# Patient Record
Sex: FEMALE | Race: WHITE | NOT HISPANIC OR LATINO | Employment: UNEMPLOYED | URBAN - METROPOLITAN AREA
[De-identification: names, ages, dates, MRNs, and addresses within clinical notes are randomized per-mention and may not be internally consistent; named-entity substitution may affect disease eponyms.]

---

## 2017-09-05 PROBLEM — Z00.00 ENCOUNTER FOR PREVENTIVE HEALTH EXAMINATION: Status: ACTIVE | Noted: 2017-09-05

## 2021-10-13 ENCOUNTER — OFFICE VISIT (OUTPATIENT)
Dept: GASTROENTEROLOGY | Facility: CLINIC | Age: 46
End: 2021-10-13
Payer: COMMERCIAL

## 2021-10-13 ENCOUNTER — APPOINTMENT (OUTPATIENT)
Dept: LAB | Facility: CLINIC | Age: 46
End: 2021-10-13
Payer: COMMERCIAL

## 2021-10-13 ENCOUNTER — TELEPHONE (OUTPATIENT)
Dept: GASTROENTEROLOGY | Facility: CLINIC | Age: 46
End: 2021-10-13

## 2021-10-13 VITALS
BODY MASS INDEX: 28.45 KG/M2 | SYSTOLIC BLOOD PRESSURE: 110 MMHG | WEIGHT: 177 LBS | TEMPERATURE: 98.4 F | HEART RATE: 70 BPM | HEIGHT: 66 IN | DIASTOLIC BLOOD PRESSURE: 70 MMHG | OXYGEN SATURATION: 97 %

## 2021-10-13 DIAGNOSIS — R10.84 GENERALIZED ABDOMINAL PAIN: ICD-10-CM

## 2021-10-13 DIAGNOSIS — R10.84 GENERALIZED ABDOMINAL PAIN: Primary | ICD-10-CM

## 2021-10-13 DIAGNOSIS — R14.0 ABDOMINAL BLOATING: ICD-10-CM

## 2021-10-13 DIAGNOSIS — K92.1 HEMATOCHEZIA: ICD-10-CM

## 2021-10-13 LAB
BASOPHILS # BLD AUTO: 0.1 THOUSANDS/ΜL (ref 0–0.1)
BASOPHILS NFR BLD AUTO: 2 % (ref 0–1)
EOSINOPHIL # BLD AUTO: 0.12 THOUSAND/ΜL (ref 0–0.61)
EOSINOPHIL NFR BLD AUTO: 2 % (ref 0–6)
ERYTHROCYTE [DISTWIDTH] IN BLOOD BY AUTOMATED COUNT: 15.3 % (ref 11.6–15.1)
HCT VFR BLD AUTO: 34.9 % (ref 34.8–46.1)
HGB BLD-MCNC: 10.9 G/DL (ref 11.5–15.4)
IGA SERPL-MCNC: 158 MG/DL (ref 70–400)
IMM GRANULOCYTES # BLD AUTO: 0.01 THOUSAND/UL (ref 0–0.2)
IMM GRANULOCYTES NFR BLD AUTO: 0 % (ref 0–2)
LYMPHOCYTES # BLD AUTO: 2.5 THOUSANDS/ΜL (ref 0.6–4.47)
LYMPHOCYTES NFR BLD AUTO: 41 % (ref 14–44)
MCH RBC QN AUTO: 26.1 PG (ref 26.8–34.3)
MCHC RBC AUTO-ENTMCNC: 31.2 G/DL (ref 31.4–37.4)
MCV RBC AUTO: 84 FL (ref 82–98)
MONOCYTES # BLD AUTO: 0.39 THOUSAND/ΜL (ref 0.17–1.22)
MONOCYTES NFR BLD AUTO: 6 % (ref 4–12)
NEUTROPHILS # BLD AUTO: 2.99 THOUSANDS/ΜL (ref 1.85–7.62)
NEUTS SEG NFR BLD AUTO: 49 % (ref 43–75)
NRBC BLD AUTO-RTO: 0 /100 WBCS
PLATELET # BLD AUTO: 459 THOUSANDS/UL (ref 149–390)
PMV BLD AUTO: 10.8 FL (ref 8.9–12.7)
RBC # BLD AUTO: 4.18 MILLION/UL (ref 3.81–5.12)
TSH SERPL DL<=0.05 MIU/L-ACNC: 1.7 UIU/ML (ref 0.36–3.74)
WBC # BLD AUTO: 6.11 THOUSAND/UL (ref 4.31–10.16)

## 2021-10-13 PROCEDURE — 87338 HPYLORI STOOL AG IA: CPT

## 2021-10-13 PROCEDURE — 82784 ASSAY IGA/IGD/IGG/IGM EACH: CPT

## 2021-10-13 PROCEDURE — 83516 IMMUNOASSAY NONANTIBODY: CPT

## 2021-10-13 PROCEDURE — 87329 GIARDIA AG IA: CPT

## 2021-10-13 PROCEDURE — 99242 OFF/OP CONSLTJ NEW/EST SF 20: CPT | Performed by: PHYSICIAN ASSISTANT

## 2021-10-13 PROCEDURE — 84443 ASSAY THYROID STIM HORMONE: CPT

## 2021-10-13 PROCEDURE — 85025 COMPLETE CBC W/AUTO DIFF WBC: CPT

## 2021-10-13 PROCEDURE — 83993 ASSAY FOR CALPROTECTIN FECAL: CPT

## 2021-10-13 PROCEDURE — 36415 COLL VENOUS BLD VENIPUNCTURE: CPT

## 2021-10-13 RX ORDER — SODIUM PICOSULFATE, MAGNESIUM OXIDE, AND ANHYDROUS CITRIC ACID 10; 3.5; 12 MG/160ML; G/160ML; G/160ML
LIQUID ORAL
Qty: 320 ML | Refills: 0 | Status: SHIPPED | OUTPATIENT
Start: 2021-10-13 | End: 2021-11-08

## 2021-10-14 LAB
G LAMBLIA AG STL QL IA: NEGATIVE
H PYLORI AG STL QL IA: NEGATIVE
TTG IGA SER-ACNC: <2 U/ML (ref 0–3)

## 2021-10-15 ENCOUNTER — TELEPHONE (OUTPATIENT)
Dept: GASTROENTEROLOGY | Facility: CLINIC | Age: 46
End: 2021-10-15

## 2021-10-15 ENCOUNTER — PREP FOR PROCEDURE (OUTPATIENT)
Dept: GASTROENTEROLOGY | Facility: CLINIC | Age: 46
End: 2021-10-15

## 2021-10-15 DIAGNOSIS — R10.84 GENERALIZED ABDOMINAL PAIN: Primary | ICD-10-CM

## 2021-10-15 DIAGNOSIS — R14.0 ABDOMINAL BLOATING: ICD-10-CM

## 2021-10-15 DIAGNOSIS — K92.1 HEMATOCHEZIA: Primary | ICD-10-CM

## 2021-10-15 DIAGNOSIS — R10.84 GENERALIZED ABDOMINAL PAIN: ICD-10-CM

## 2021-10-15 LAB — CALPROTECTIN STL-MCNT: <16 UG/G (ref 0–120)

## 2021-11-08 ENCOUNTER — OFFICE VISIT (OUTPATIENT)
Dept: OBGYN CLINIC | Facility: CLINIC | Age: 46
End: 2021-11-08
Payer: COMMERCIAL

## 2021-11-08 VITALS
BODY MASS INDEX: 31.71 KG/M2 | HEIGHT: 63 IN | SYSTOLIC BLOOD PRESSURE: 90 MMHG | WEIGHT: 179 LBS | DIASTOLIC BLOOD PRESSURE: 62 MMHG

## 2021-11-08 DIAGNOSIS — Z11.3 SCREEN FOR STD (SEXUALLY TRANSMITTED DISEASE): ICD-10-CM

## 2021-11-08 DIAGNOSIS — N93.9 ABNORMAL UTERINE BLEEDING (AUB): Primary | ICD-10-CM

## 2021-11-08 DIAGNOSIS — N92.3 INTERMENSTRUAL BLEEDING: ICD-10-CM

## 2021-11-08 DIAGNOSIS — N94.6 DYSMENORRHEA: ICD-10-CM

## 2021-11-08 PROCEDURE — 99203 OFFICE O/P NEW LOW 30 MIN: CPT | Performed by: CLINICAL NURSE SPECIALIST

## 2021-11-08 RX ORDER — IBUPROFEN 800 MG/1
800 TABLET ORAL EVERY 6 HOURS PRN
COMMUNITY

## 2021-11-08 RX ORDER — TRANEXAMIC ACID 650 1/1
1300 TABLET ORAL 2 TIMES DAILY
Qty: 20 TABLET | Refills: 1 | Status: SHIPPED | OUTPATIENT
Start: 2021-11-08

## 2021-11-11 ENCOUNTER — TELEPHONE (OUTPATIENT)
Dept: OBGYN CLINIC | Facility: CLINIC | Age: 46
End: 2021-11-11

## 2021-11-11 LAB
C TRACH RRNA SPEC QL NAA+PROBE: NEGATIVE
N GONORRHOEA RRNA SPEC QL NAA+PROBE: NEGATIVE

## 2021-11-16 ENCOUNTER — TELEPHONE (OUTPATIENT)
Dept: GASTROENTEROLOGY | Facility: AMBULARY SURGERY CENTER | Age: 46
End: 2021-11-16

## 2021-11-16 ENCOUNTER — TELEPHONE (OUTPATIENT)
Dept: GASTROENTEROLOGY | Facility: CLINIC | Age: 46
End: 2021-11-16

## 2021-11-17 ENCOUNTER — ANESTHESIA (OUTPATIENT)
Dept: GASTROENTEROLOGY | Facility: AMBULARY SURGERY CENTER | Age: 46
End: 2021-11-17

## 2021-11-17 ENCOUNTER — TELEPHONE (OUTPATIENT)
Dept: GASTROENTEROLOGY | Facility: AMBULARY SURGERY CENTER | Age: 46
End: 2021-11-17

## 2021-11-17 ENCOUNTER — HOSPITAL ENCOUNTER (OUTPATIENT)
Dept: GASTROENTEROLOGY | Facility: AMBULARY SURGERY CENTER | Age: 46
Setting detail: OUTPATIENT SURGERY
Discharge: HOME | End: 2021-11-17
Attending: INTERNAL MEDICINE
Payer: COMMERCIAL

## 2021-11-17 ENCOUNTER — ANESTHESIA EVENT (OUTPATIENT)
Dept: GASTROENTEROLOGY | Facility: AMBULARY SURGERY CENTER | Age: 46
End: 2021-11-17

## 2021-11-17 VITALS
HEART RATE: 64 BPM | OXYGEN SATURATION: 100 % | TEMPERATURE: 97 F | DIASTOLIC BLOOD PRESSURE: 66 MMHG | RESPIRATION RATE: 16 BRPM | HEIGHT: 62 IN | SYSTOLIC BLOOD PRESSURE: 107 MMHG | BODY MASS INDEX: 32.02 KG/M2 | WEIGHT: 174 LBS

## 2021-11-17 DIAGNOSIS — K64.9 HEMORRHOIDS, UNSPECIFIED HEMORRHOID TYPE: ICD-10-CM

## 2021-11-17 DIAGNOSIS — R14.0 ABDOMINAL BLOATING: ICD-10-CM

## 2021-11-17 DIAGNOSIS — R10.84 GENERALIZED ABDOMINAL PAIN: ICD-10-CM

## 2021-11-17 DIAGNOSIS — K92.1 HEMATOCHEZIA: ICD-10-CM

## 2021-11-17 DIAGNOSIS — K29.80 DUODENITIS: Primary | ICD-10-CM

## 2021-11-17 LAB
EXT PREGNANCY TEST URINE: NEGATIVE
EXT. CONTROL: NORMAL

## 2021-11-17 PROCEDURE — 45378 DIAGNOSTIC COLONOSCOPY: CPT | Performed by: INTERNAL MEDICINE

## 2021-11-17 PROCEDURE — 88305 TISSUE EXAM BY PATHOLOGIST: CPT | Performed by: PATHOLOGY

## 2021-11-17 PROCEDURE — 88342 IMHCHEM/IMCYTCHM 1ST ANTB: CPT | Performed by: PATHOLOGY

## 2021-11-17 PROCEDURE — 43239 EGD BIOPSY SINGLE/MULTIPLE: CPT | Performed by: INTERNAL MEDICINE

## 2021-11-17 PROCEDURE — 81025 URINE PREGNANCY TEST: CPT | Performed by: INTERNAL MEDICINE

## 2021-11-17 RX ORDER — FENTANYL CITRATE 50 UG/ML
INJECTION, SOLUTION INTRAMUSCULAR; INTRAVENOUS AS NEEDED
Status: DISCONTINUED | OUTPATIENT
Start: 2021-11-17 | End: 2021-11-17

## 2021-11-17 RX ORDER — LIDOCAINE HYDROCHLORIDE 10 MG/ML
INJECTION, SOLUTION EPIDURAL; INFILTRATION; INTRACAUDAL; PERINEURAL AS NEEDED
Status: DISCONTINUED | OUTPATIENT
Start: 2021-11-17 | End: 2021-11-17

## 2021-11-17 RX ORDER — SODIUM CHLORIDE, SODIUM LACTATE, POTASSIUM CHLORIDE, CALCIUM CHLORIDE 600; 310; 30; 20 MG/100ML; MG/100ML; MG/100ML; MG/100ML
INJECTION, SOLUTION INTRAVENOUS CONTINUOUS PRN
Status: DISCONTINUED | OUTPATIENT
Start: 2021-11-17 | End: 2021-11-17

## 2021-11-17 RX ORDER — PROPOFOL 10 MG/ML
INJECTION, EMULSION INTRAVENOUS AS NEEDED
Status: DISCONTINUED | OUTPATIENT
Start: 2021-11-17 | End: 2021-11-17

## 2021-11-17 RX ORDER — PROPOFOL 10 MG/ML
INJECTION, EMULSION INTRAVENOUS CONTINUOUS PRN
Status: DISCONTINUED | OUTPATIENT
Start: 2021-11-17 | End: 2021-11-17

## 2021-11-17 RX ORDER — OMEPRAZOLE 40 MG/1
40 CAPSULE, DELAYED RELEASE ORAL DAILY
Qty: 60 CAPSULE | Refills: 1 | Status: SHIPPED | OUTPATIENT
Start: 2021-11-17 | End: 2022-01-19

## 2021-11-17 RX ADMIN — PROPOFOL 50 MG: 10 INJECTION, EMULSION INTRAVENOUS at 13:36

## 2021-11-17 RX ADMIN — FENTANYL CITRATE 50 MCG: 50 INJECTION, SOLUTION INTRAMUSCULAR; INTRAVENOUS at 13:20

## 2021-11-17 RX ADMIN — LIDOCAINE HYDROCHLORIDE 100 MG: 10 INJECTION, SOLUTION EPIDURAL; INFILTRATION; INTRACAUDAL at 13:24

## 2021-11-17 RX ADMIN — PROPOFOL 50 MG: 10 INJECTION, EMULSION INTRAVENOUS at 13:27

## 2021-11-17 RX ADMIN — FENTANYL CITRATE 25 MCG: 50 INJECTION, SOLUTION INTRAMUSCULAR; INTRAVENOUS at 13:29

## 2021-11-17 RX ADMIN — PROPOFOL 150 MG: 10 INJECTION, EMULSION INTRAVENOUS at 13:24

## 2021-11-17 RX ADMIN — PROPOFOL 50 MG: 10 INJECTION, EMULSION INTRAVENOUS at 13:41

## 2021-11-17 RX ADMIN — PROPOFOL 30 MG: 10 INJECTION, EMULSION INTRAVENOUS at 13:30

## 2021-11-17 RX ADMIN — PROPOFOL 20 MG: 10 INJECTION, EMULSION INTRAVENOUS at 13:29

## 2021-11-17 RX ADMIN — SODIUM CHLORIDE, SODIUM LACTATE, POTASSIUM CHLORIDE, AND CALCIUM CHLORIDE: .6; .31; .03; .02 INJECTION, SOLUTION INTRAVENOUS at 13:03

## 2021-11-17 RX ADMIN — PROPOFOL 30 MG: 10 INJECTION, EMULSION INTRAVENOUS at 13:25

## 2021-11-17 RX ADMIN — PROPOFOL 50 MG: 10 INJECTION, EMULSION INTRAVENOUS at 13:38

## 2021-11-17 RX ADMIN — PROPOFOL 20 MG: 10 INJECTION, EMULSION INTRAVENOUS at 13:32

## 2021-11-17 RX ADMIN — PROPOFOL 50 MG: 10 INJECTION, EMULSION INTRAVENOUS at 13:34

## 2021-11-17 RX ADMIN — PROPOFOL 140 MCG/KG/MIN: 10 INJECTION, EMULSION INTRAVENOUS at 13:30

## 2021-11-22 ENCOUNTER — HOSPITAL ENCOUNTER (OUTPATIENT)
Dept: ULTRASOUND IMAGING | Facility: HOSPITAL | Age: 46
Discharge: HOME | End: 2021-11-22
Payer: COMMERCIAL

## 2021-11-22 DIAGNOSIS — N93.9 ABNORMAL UTERINE BLEEDING (AUB): ICD-10-CM

## 2021-11-22 PROCEDURE — 76856 US EXAM PELVIC COMPLETE: CPT

## 2021-11-22 PROCEDURE — 76830 TRANSVAGINAL US NON-OB: CPT

## 2021-12-09 PROBLEM — K64.9 HEMORRHOIDS: Status: ACTIVE | Noted: 2021-12-09

## 2021-12-13 ENCOUNTER — TELEPHONE (OUTPATIENT)
Dept: OBGYN CLINIC | Facility: CLINIC | Age: 46
End: 2021-12-13

## 2021-12-13 ENCOUNTER — OFFICE VISIT (OUTPATIENT)
Dept: OBGYN CLINIC | Facility: CLINIC | Age: 46
End: 2021-12-13
Payer: COMMERCIAL

## 2021-12-13 VITALS — BODY MASS INDEX: 32.56 KG/M2 | DIASTOLIC BLOOD PRESSURE: 72 MMHG | SYSTOLIC BLOOD PRESSURE: 110 MMHG | WEIGHT: 178 LBS

## 2021-12-13 DIAGNOSIS — N93.9 ABNORMAL UTERINE BLEEDING (AUB): Primary | ICD-10-CM

## 2021-12-13 DIAGNOSIS — Z30.430 ENCOUNTER FOR IUD INSERTION: ICD-10-CM

## 2021-12-13 DIAGNOSIS — N94.6 DYSMENORRHEA: ICD-10-CM

## 2021-12-13 PROCEDURE — 58300 INSERT INTRAUTERINE DEVICE: CPT | Performed by: CLINICAL NURSE SPECIALIST

## 2021-12-23 ENCOUNTER — APPOINTMENT (OUTPATIENT)
Dept: LAB | Facility: HOSPITAL | Age: 46
End: 2021-12-23
Payer: COMMERCIAL

## 2021-12-23 DIAGNOSIS — K29.70 HELICOBACTER PYLORI GASTRITIS: ICD-10-CM

## 2021-12-23 DIAGNOSIS — B96.81 HELICOBACTER PYLORI GASTRITIS: ICD-10-CM

## 2021-12-23 PROCEDURE — 87338 HPYLORI STOOL AG IA: CPT

## 2021-12-24 LAB — H PYLORI AG STL QL IA: POSITIVE

## 2022-01-03 ENCOUNTER — OFFICE VISIT (OUTPATIENT)
Dept: OBGYN CLINIC | Facility: CLINIC | Age: 47
End: 2022-01-03
Payer: COMMERCIAL

## 2022-01-03 VITALS — BODY MASS INDEX: 33.11 KG/M2 | DIASTOLIC BLOOD PRESSURE: 70 MMHG | WEIGHT: 181 LBS | SYSTOLIC BLOOD PRESSURE: 100 MMHG

## 2022-01-03 DIAGNOSIS — N93.9 ABNORMAL UTERINE BLEEDING (AUB): ICD-10-CM

## 2022-01-03 DIAGNOSIS — Z30.431 SURVEILLANCE OF INTRAUTERINE CONTRACEPTIVE DEVICE: Primary | ICD-10-CM

## 2022-01-03 DIAGNOSIS — N89.8 LEUKORRHEA: ICD-10-CM

## 2022-01-03 LAB
BV WHIFF TEST VAG QL: NORMAL
CLUE CELLS SPEC QL WET PREP: NORMAL
PH SMN: 5 [PH]
SL AMB POCT WET MOUNT: NORMAL
T VAGINALIS VAG QL WET PREP: NORMAL
YEAST VAG QL WET PREP: NORMAL

## 2022-01-03 PROCEDURE — 87210 SMEAR WET MOUNT SALINE/INK: CPT | Performed by: CLINICAL NURSE SPECIALIST

## 2022-01-03 PROCEDURE — 99213 OFFICE O/P EST LOW 20 MIN: CPT | Performed by: CLINICAL NURSE SPECIALIST

## 2022-01-03 NOTE — PATIENT INSTRUCTIONS
IUD appears normally positioned today  The discomfort you have with her  And heaviness with the bleeding should improve each month and by 6 months we should better at optimum level of control    If you're still having heavy bleeding or painful menses at that point please schedule an appointment with one of our physicians to discuss alternate options     feel free to schedule an appointment with me when you return from being out of country of for another recheck to see how you doing with this option

## 2022-01-03 NOTE — ASSESSMENT & PLAN NOTE
Exam was normal today with IUD string seen at expected length  Reassured regarding common side effects associated w/ the first few months after Mirena IUD insertion  It may take 3-6 months till we reach optimal  Effectiveness      she is going out of the country for a few months and will call our office when she returns for another recheck to see if this is being effective for this

## 2022-01-03 NOTE — PROGRESS NOTES
Assessment/Plan:       1  Surveillance of intrauterine contraceptive device  Assessment & Plan:  Exam was normal today with IUD string seen at expected length  Reassured regarding common side effects associated w/ the first few months after Mirena IUD insertion  It may take 3-6 months till we reach optimal  Effectiveness  she is going out of the country for a few months and will call our office when she returns for another recheck to see if this is being effective for this        2  Abnormal uterine bleeding (AUB)    3  Leukorrhea  -     POCT wet mount          Subjective:      Patient ID: Anna Mckeon is a 42 y o  female  She is here for Follow-up (Mirena follow-up  Having irregular bleeding and cramping  )    HPI  Had Mirena IUD inserted on 12/13  Going out of country and here early for recheck     reported mild intermittent light bleeding  Thinks she may be starting with her menstrual cycle now  She does however continue to have moderate cramping prior to onset of menses  But has only been 3 wks since insertion      She also notices that she has had a slight increase in discharge but is not bothered by this  It does not have a fishy odor and does not feel itchy or irritated  Menstrual History:  Patient's last menstrual period was 12/09/2021 (exact date)  The following portions of the patient's history were reviewed and updated as appropriate: allergies, current medications, past family history, past medical history, past social history, past surgical history, and problem list     Review of Systems  See HPI for pertinent positives          Objective:    /70 (BP Location: Left arm, Patient Position: Sitting, Cuff Size: Adult)   Wt 82 1 kg (181 lb)   LMP 12/09/2021 (Exact Date)   BMI 33 11 kg/m²      Physical Exam  Constitutional:       General: She is not in acute distress  Appearance: Normal appearance  Genitourinary:      Urethral meatus normal       No lesions in the vagina  Right Labia: No rash, lesions or skin changes  Left Labia: No lesions, skin changes or rash  Vaginal discharge (small amt thin yellow d/c   no strong malodor) present  No vaginal erythema, tenderness, bleeding or ulceration  Right Adnexa: not tender, not full and no mass present  Left Adnexa: not tender, not full and no mass present  No cervical motion tenderness, discharge, friability or lesion  IUD strings visualized (approx 3-4 cm as expected)  Uterus is not enlarged or tender  Bladder is not tender  Pelvic exam was performed with patient in the lithotomy position  Rectum:      No external hemorrhoid  HENT:      Head: Normocephalic  Cardiovascular:      Rate and Rhythm: Normal rate  Pulmonary:      Effort: Pulmonary effort is normal    Neurological:      Mental Status: She is alert and oriented to person, place, and time  Psychiatric:         Mood and Affect: Mood normal          Behavior: Behavior normal    Vitals reviewed         Results for orders placed or performed in visit on 01/03/22   POCT wet mount   Result Value Ref Range    WET MOUNT pos     Yeast, Wet Prep neg     pH 5     Whiff Test neg     Clue Cells neg     Trich, Wet Prep neg

## 2022-01-18 DIAGNOSIS — K29.80 DUODENITIS: ICD-10-CM

## 2022-01-18 DIAGNOSIS — R10.84 GENERALIZED ABDOMINAL PAIN: ICD-10-CM

## 2022-01-19 RX ORDER — OMEPRAZOLE 40 MG/1
CAPSULE, DELAYED RELEASE ORAL
Qty: 60 CAPSULE | Refills: 1 | Status: SHIPPED | OUTPATIENT
Start: 2022-01-19

## 2022-07-29 ENCOUNTER — HOSPITAL ENCOUNTER (EMERGENCY)
Facility: HOSPITAL | Age: 47
Discharge: HOME | End: 2022-07-29
Attending: EMERGENCY MEDICINE
Payer: COMMERCIAL

## 2022-07-29 VITALS
HEART RATE: 72 BPM | DIASTOLIC BLOOD PRESSURE: 53 MMHG | TEMPERATURE: 98.3 F | SYSTOLIC BLOOD PRESSURE: 112 MMHG | OXYGEN SATURATION: 99 % | RESPIRATION RATE: 20 BRPM

## 2022-07-29 DIAGNOSIS — E87.6 HYPOKALEMIA: ICD-10-CM

## 2022-07-29 DIAGNOSIS — M26.622 ARTHRALGIA OF LEFT TEMPOROMANDIBULAR JOINT: Primary | ICD-10-CM

## 2022-07-29 LAB
ANION GAP SERPL CALCULATED.3IONS-SCNC: 8 MMOL/L (ref 4–13)
BASOPHILS # BLD AUTO: 0.03 THOUSANDS/ΜL (ref 0–0.1)
BASOPHILS NFR BLD AUTO: 0 % (ref 0–1)
BUN SERPL-MCNC: 8 MG/DL (ref 5–25)
CALCIUM SERPL-MCNC: 9.6 MG/DL (ref 8.3–10.1)
CHLORIDE SERPL-SCNC: 104 MMOL/L (ref 96–108)
CO2 SERPL-SCNC: 26 MMOL/L (ref 21–32)
CREAT SERPL-MCNC: 0.38 MG/DL (ref 0.6–1.3)
EOSINOPHIL # BLD AUTO: 0.11 THOUSAND/ΜL (ref 0–0.61)
EOSINOPHIL NFR BLD AUTO: 2 % (ref 0–6)
ERYTHROCYTE [DISTWIDTH] IN BLOOD BY AUTOMATED COUNT: 12.7 % (ref 11.6–15.1)
GFR SERPL CREATININE-BSD FRML MDRD: 129 ML/MIN/1.73SQ M
GLUCOSE SERPL-MCNC: 122 MG/DL (ref 65–140)
HCT VFR BLD AUTO: 35.2 % (ref 34.8–46.1)
HGB BLD-MCNC: 11.3 G/DL (ref 11.5–15.4)
IMM GRANULOCYTES # BLD AUTO: 0.02 THOUSAND/UL (ref 0–0.2)
IMM GRANULOCYTES NFR BLD AUTO: 0 % (ref 0–2)
LYMPHOCYTES # BLD AUTO: 1.87 THOUSANDS/ΜL (ref 0.6–4.47)
LYMPHOCYTES NFR BLD AUTO: 27 % (ref 14–44)
MCH RBC QN AUTO: 25.9 PG (ref 26.8–34.3)
MCHC RBC AUTO-ENTMCNC: 32.1 G/DL (ref 31.4–37.4)
MCV RBC AUTO: 81 FL (ref 82–98)
MONOCYTES # BLD AUTO: 0.65 THOUSAND/ΜL (ref 0.17–1.22)
MONOCYTES NFR BLD AUTO: 10 % (ref 4–12)
NEUTROPHILS # BLD AUTO: 4.14 THOUSANDS/ΜL (ref 1.85–7.62)
NEUTS SEG NFR BLD AUTO: 61 % (ref 43–75)
NRBC BLD AUTO-RTO: 0 /100 WBCS
PLATELET # BLD AUTO: 356 THOUSANDS/UL (ref 149–390)
PMV BLD AUTO: 11.1 FL (ref 8.9–12.7)
POTASSIUM SERPL-SCNC: 3.3 MMOL/L (ref 3.5–5.3)
RBC # BLD AUTO: 4.36 MILLION/UL (ref 3.81–5.12)
SODIUM SERPL-SCNC: 138 MMOL/L (ref 135–147)
WBC # BLD AUTO: 6.82 THOUSAND/UL (ref 4.31–10.16)

## 2022-07-29 PROCEDURE — 80048 BASIC METABOLIC PNL TOTAL CA: CPT | Performed by: EMERGENCY MEDICINE

## 2022-07-29 PROCEDURE — 96372 THER/PROPH/DIAG INJ SC/IM: CPT

## 2022-07-29 PROCEDURE — 36415 COLL VENOUS BLD VENIPUNCTURE: CPT

## 2022-07-29 PROCEDURE — 85025 COMPLETE CBC W/AUTO DIFF WBC: CPT | Performed by: EMERGENCY MEDICINE

## 2022-07-29 PROCEDURE — 99283 EMERGENCY DEPT VISIT LOW MDM: CPT

## 2022-07-29 PROCEDURE — 99284 EMERGENCY DEPT VISIT MOD MDM: CPT | Performed by: EMERGENCY MEDICINE

## 2022-07-29 RX ORDER — ACETAMINOPHEN 325 MG/1
650 TABLET ORAL ONCE
Status: COMPLETED | OUTPATIENT
Start: 2022-07-29 | End: 2022-07-29

## 2022-07-29 RX ORDER — CYCLOBENZAPRINE HCL 10 MG
10 TABLET ORAL 2 TIMES DAILY PRN
Qty: 20 TABLET | Refills: 0 | Status: SHIPPED | OUTPATIENT
Start: 2022-07-29

## 2022-07-29 RX ORDER — KETOROLAC TROMETHAMINE 30 MG/ML
15 INJECTION, SOLUTION INTRAMUSCULAR; INTRAVENOUS ONCE
Status: COMPLETED | OUTPATIENT
Start: 2022-07-29 | End: 2022-07-29

## 2022-07-29 RX ADMIN — ACETAMINOPHEN 650 MG: 325 TABLET ORAL at 15:18

## 2022-07-29 RX ADMIN — KETOROLAC TROMETHAMINE 15 MG: 30 INJECTION, SOLUTION INTRAMUSCULAR at 15:18

## 2022-07-29 NOTE — ED PROVIDER NOTES
Pt Name: Casey Velásquez  MRN: [id number]  [place] 1975  Age/Sex: 42 y o  female  Date of evaluation: 2022  PCP: Darcy Cortés MD    CHIEF COMPLAINT    Chief Complaint   Patient presents with   Keyona Lambert     Patient c/o left ear pain on and off for 6 months  Patient states"ear pain goes away and comes back"         HPI and MDM    42 y o  female presenting with left ear pain on and off for the last year  Usually lasts a week or so  This time it has been lasting for 3 weeks now  Has never seen anybody for this  No trauma  No difficulty hearing  No headache, no fevers or chills  No drainage  Pain goes into her left jaw  It is worse with opening her mouth  No toothache no numbness or tingling  No signs of infection on examination  Patient's presentation consistent with left TMJ syndrome  Patient uncertain if she grinds her teeth at night  Pain is worse with opening her mouth  She does have tenderness over the left TMJ, states it does click sometimes  History of gastric ulcers there for cannot take prolonged NSAIDs  Given a dose of Toradol and Tylenol in the emergency department  Will try muscle relaxers  Advised OMS/ENT follow-up  PCP follow-up  Return precautions discussed, patient verbalized understanding and is in agreement plan  Medications   ketorolac (TORADOL) injection 15 mg (15 mg Intramuscular Given 22 151)   acetaminophen (TYLENOL) tablet 650 mg (650 mg Oral Given 22)         Past Medical and Surgical History    Past Medical History:   Diagnosis Date    Fibroid uterus     PCOS (polycystic ovarian syndrome)        Past Surgical History:   Procedure Laterality Date     SECTION      COLONOSCOPY         History reviewed  No pertinent family history      Social History     Tobacco Use    Smoking status: Never Smoker    Smokeless tobacco: Never Used   Vaping Use    Vaping Use: Never used   Substance Use Topics    Alcohol use: Never    Drug use: Never           Allergies    No Known Allergies    Home Medications    Prior to Admission medications    Medication Sig Start Date End Date Taking? Authorizing Provider   cyclobenzaprine (FLEXERIL) 10 mg tablet Take 1 tablet (10 mg total) by mouth 2 (two) times a day as needed for muscle spasms 7/29/22  Yes Bryan Dykes DO   ibuprofen (MOTRIN) 800 mg tablet Take 800 mg by mouth every 6 (six) hours as needed for mild pain  Patient not taking: Reported on 1/3/2022     Historical Provider, MD   levonorgestrel (MIRENA) 20 MCG/24HR IUD 1 each by Intrauterine route once    Historical Provider, MD   omeprazole (PriLOSEC) 40 MG capsule TAKE 1 CAPSULE BY MOUTH EVERY DAY 1/19/22   Scott Betancur MD   pantoprazole (PROTONIX) 40 mg tablet Take 1 tablet (40 mg total) by mouth 2 (two) times a day for 14 days 1/6/22 1/20/22  Elroy Bradford PA-C   Tranexamic Acid (Lysteda) 650 MG TABS Take 2 tablets (1,300 mg total) by mouth 2 (two) times a day  Patient not taking: Reported on 12/13/2021 11/8/21   HUMA Lewis           Review of Systems    Review of Systems   Constitutional: Negative for chills and fever  HENT: Positive for ear pain  Negative for rhinorrhea and sore throat  Eyes: Negative for pain and visual disturbance  Respiratory: Negative for cough and shortness of breath  Cardiovascular: Negative for chest pain and leg swelling  Gastrointestinal: Negative for abdominal pain, nausea and vomiting  Genitourinary: Negative for dysuria and hematuria  Musculoskeletal: Negative for back pain and myalgias  Skin: Negative for rash and wound  Neurological: Negative for syncope and headaches             Physical Exam      ED Triage Vitals [07/29/22 1146]   Temperature Pulse Respirations Blood Pressure SpO2   98 3 °F (36 8 °C) (!) 117 16 124/65 98 %      Temp Source Heart Rate Source Patient Position - Orthostatic VS BP Location FiO2 (%)   Oral Monitor Sitting Left arm --      Pain Score --               Physical Exam  Constitutional:       General: She is not in acute distress  Appearance: She is not ill-appearing  HENT:      Head: Normocephalic and atraumatic  Right Ear: Tympanic membrane, ear canal and external ear normal       Left Ear: Tympanic membrane, ear canal and external ear normal       Ears:      Comments: No tenderness over mastoid processes     Nose: Nose normal       Mouth/Throat:      Mouth: Mucous membranes are moist       Comments: L TMJ tenderness to palpation  Eyes:      Extraocular Movements: Extraocular movements intact  Pupils: Pupils are equal, round, and reactive to light  Cardiovascular:      Rate and Rhythm: Normal rate and regular rhythm  Pulmonary:      Effort: Pulmonary effort is normal  No respiratory distress  Abdominal:      General: There is no distension  Musculoskeletal:         General: No swelling or deformity  Normal range of motion  Cervical back: Normal range of motion and neck supple  Skin:     General: Skin is warm  Findings: No erythema  Neurological:      Mental Status: She is alert and oriented to person, place, and time  Mental status is at baseline                Diagnostic Results      Labs:    Results Reviewed     Procedure Component Value Units Date/Time    CBC and differential [[id number]]  (Abnormal) Collected: 07/29/22 1213    Lab Status: Final result Specimen: Blood from Arm, Left Updated: 07/29/22 1236     WBC 6 82 Thousand/uL      RBC 4 36 Million/uL      Hemoglobin 11 3 g/dL      Hematocrit 35 2 %      MCV 81 fL      MCH 25 9 pg      MCHC 32 1 g/dL      RDW 12 7 %      MPV 11 1 fL      Platelets 115 Thousands/uL      nRBC 0 /100 WBCs      Neutrophils Relative 61 %      Immat GRANS % 0 %      Lymphocytes Relative 27 %      Monocytes Relative 10 %      Eosinophils Relative 2 %      Basophils Relative 0 %      Neutrophils Absolute 4 14 Thousands/µL      Immature Grans Absolute 0 02 Thousand/uL Lymphocytes Absolute 1 87 Thousands/µL      Monocytes Absolute 0 65 Thousand/µL      Eosinophils Absolute 0 11 Thousand/µL      Basophils Absolute 0 03 Thousands/µL     Basic metabolic panel [[id number]]  (Abnormal) Collected: 07/29/22 1213    Lab Status: Final result Specimen: Blood from Arm, Left Updated: 07/29/22 1235     Sodium 138 mmol/L      Potassium 3 3 mmol/L      Chloride 104 mmol/L      CO2 26 mmol/L      ANION GAP 8 mmol/L      BUN 8 mg/dL      Creatinine 0 38 mg/dL      Glucose 122 mg/dL      Calcium 9 6 mg/dL      eGFR 129 ml/min/1 73sq m     Narrative:      [place] guidelines for Chronic Kidney Disease (CKD):     Stage 1 with normal or high GFR (GFR > 90 mL/min/1 73 square meters)    Stage 2 Mild CKD (GFR = 60-89 mL/min/1 73 square meters)    Stage 3A Moderate CKD (GFR = 45-59 mL/min/1 73 square meters)    Stage 3B Moderate CKD (GFR = 30-44 mL/min/1 73 square meters)    Stage 4 Severe CKD (GFR = 15-29 mL/min/1 73 square meters)    Stage 5 End Stage CKD (GFR <15 mL/min/1 73 square meters)  Note: GFR calculation is accurate only with a steady state creatinine          All labs reviewed and utilized in the medical decision making process    Radiology:    No orders to display       All radiology studies independently viewed by me and interpreted by the radiologist     Procedure    Procedures        FINAL IMPRESSION    Final diagnoses:   Arthralgia of left temporomandibular joint   Hypokalemia         DISPOSITION    Time reflects when diagnosis was documented in both MDM as applicable and the Disposition within this note     Time User Action Codes Description Comment    7/29/2022  3:11 PM Melvena Orn Add [M26 622] Arthralgia of left temporomandibular joint     7/29/2022  3:26 PM Melvena Orn Add [E87 6] Hypokalemia       ED Disposition     ED Disposition   Discharge    Condition   Stable    Date/Time   Fri Jul 29, 2022  3:11 PM    Comment   Chary Dominguez discharge to home/self care  Follow-up Information     Follow up With Specialties Details Why Contact Info Additional [address]  for Oral and Maxillofacial Surgery [place]  Schedule an appointment as soon as possible for a visit in 1 week    Daryl Riley [address]     Marie Mccarty MD Ophthalmology   [address] [facility] [address]  [phone]       [facility], [address]  Throat Otolaryngology   [address] [facility] [address] [place], [address] [facility], [address]    [address] [facility] [address] [place], [address]            PATIENT REFERRED TO:    [facility] for Oral and Maxillofacial [facility] [place]  [address]  [address]  Schedule an appointment as soon as possible for a visit in 1 week      Marie Mccarty MD  [address] [facility] [address]  [phone]          [address] [address] Ear, [address] [place] [id number] [phone]          DISCHARGE MEDICATIONS:    Discharge Medication List as of 7/29/2022  3:30 PM      START taking these medications    Details   cyclobenzaprine (FLEXERIL) 10 mg tablet Take 1 tablet (10 mg total) by mouth 2 (two) times a day as needed for muscle spasms, Starting Fri 7/29/2022, Normal         CONTINUE these medications which have NOT CHANGED    Details   ibuprofen (MOTRIN) 800 mg tablet Take 800 mg by mouth every 6 (six) hours as needed for mild pain, Historical Med      levonorgestrel (MIRENA) 20 MCG/24HR IUD 1 each by Intrauterine route once, Historical Med      omeprazole (PriLOSEC) 40 MG capsule TAKE 1 CAPSULE BY MOUTH EVERY DAY, Normal      pantoprazole (PROTONIX) 40 mg tablet Take 1 tablet (40 mg total) by mouth 2 (two) times a day for 14 days, Starting Thu 1/6/2022, Until Thu 1/20/2022, Normal      Tranexamic Acid (Lysteda) 650 MG TABS Take 2 tablets (1,300 mg total) by mouth 2 (two) times a day, Starting Mon 11/8/2021, Normal             No discharge procedures on file  Edwin Telles DO        This note was partially completed using voice recognition technology, and was scanned for gross errors; however some errors may still exist  Please contact the author with any questions or requests for clarification        Edwin Telles DO  07/29/22 6845

## 2024-09-03 ENCOUNTER — APPOINTMENT (OUTPATIENT)
Dept: OBGYN | Facility: CLINIC | Age: 49
End: 2024-09-03
Payer: COMMERCIAL

## 2024-09-03 VITALS
HEART RATE: 97 BPM | DIASTOLIC BLOOD PRESSURE: 81 MMHG | OXYGEN SATURATION: 97 % | SYSTOLIC BLOOD PRESSURE: 134 MMHG | WEIGHT: 169 LBS

## 2024-09-03 DIAGNOSIS — Z82.49 FAMILY HISTORY OF ISCHEMIC HEART DISEASE AND OTHER DISEASES OF THE CIRCULATORY SYSTEM: ICD-10-CM

## 2024-09-03 DIAGNOSIS — D25.9 LEIOMYOMA OF UTERUS, UNSPECIFIED: ICD-10-CM

## 2024-09-03 DIAGNOSIS — D69.9 HEMORRHAGIC CONDITION, UNSPECIFIED: ICD-10-CM

## 2024-09-03 DIAGNOSIS — E05.00 THYROTOXICOSIS WITH DIFFUSE GOITER W/OUT THYROTOXIC CRISIS OR STORM: ICD-10-CM

## 2024-09-03 DIAGNOSIS — Z78.9 OTHER SPECIFIED HEALTH STATUS: ICD-10-CM

## 2024-09-03 PROCEDURE — 99203 OFFICE O/P NEW LOW 30 MIN: CPT

## 2024-09-03 NOTE — HISTORY OF PRESENT ILLNESS
[FreeTextEntry1] : 49 yo  c/s lmp almost continuous has mirena and helped  but continues to be almost daily.  referred by Dr Truong No bx no recent sono. PMHX graves, eye issues PSHX c/s , thyroidectomy, eye sx MEDS synthroid, prednisone for the eyes NKDA FHX neg

## 2024-09-03 NOTE — PLAN
[FreeTextEntry1] : Discussed the option of continued conservative observation of fibroids vs surgical removal. Treatment options of myomectomy, hysterectomy, ufe and continued observation were also outlined. A detailed discussion regarding the option of myomectomy vs hysterectomy was also held and the risks, benefits, and alternatives provided. All questions answered discussed the risks including but not limited to need for laparotomy, blood transfusion , possible postop infection, and injury to to GI or  tract.  Pt to schedule tlh bs. Pt needs pelvic sono and endo bx in office. During this visit 40 minutes were spent face-to-face with greater than 50% of the time dedicated to counseling.

## 2024-09-03 NOTE — PHYSICAL EXAM
[Chaperone Declined] : Patient declined chaperone [Soft] : soft [Non-tender] : non-tender [Non-distended] : non-distended [No Lesions] : no lesions [FreeTextEntry7] : 12 wks nt

## 2024-09-17 ENCOUNTER — TRANSCRIPTION ENCOUNTER (OUTPATIENT)
Age: 49
End: 2024-09-17

## 2024-09-24 ENCOUNTER — NON-APPOINTMENT (OUTPATIENT)
Age: 49
End: 2024-09-24

## 2024-10-15 ENCOUNTER — APPOINTMENT (OUTPATIENT)
Dept: OBGYN | Facility: CLINIC | Age: 49
End: 2024-10-15

## 2024-10-15 VITALS
DIASTOLIC BLOOD PRESSURE: 77 MMHG | SYSTOLIC BLOOD PRESSURE: 119 MMHG | TEMPERATURE: 97 F | OXYGEN SATURATION: 97 % | WEIGHT: 176.59 LBS | HEART RATE: 82 BPM | RESPIRATION RATE: 16 BRPM | HEIGHT: 63 IN

## 2024-10-15 NOTE — ASU PATIENT PROFILE, ADULT - FALL HARM RISK - RISK INTERVENTIONS

## 2024-10-15 NOTE — ASU PATIENT PROFILE, ADULT - NSICDXPASTSURGICALHX_GEN_ALL_CORE_FT
PAST SURGICAL HISTORY:  H/O  section     H/O eye surgery     H/O thyroidectomy     History of nasal surgery

## 2024-10-15 NOTE — ASU PATIENT PROFILE, ADULT - NSICDXPASTMEDICALHX_GEN_ALL_CORE_FT
PAST MEDICAL HISTORY:  Anemia     Fibroid     H/O thyroid nodule     H/O thyrotoxicosis     Visual impairment

## 2024-10-15 NOTE — ASU PATIENT PROFILE, ADULT - PREOP PAIN SCORE
Patient attended Phase 2 Cardiac Rehab Exercise Session. Further documentation will be scanned into the medical record upon discharge.  
0

## 2024-10-16 ENCOUNTER — TRANSCRIPTION ENCOUNTER (OUTPATIENT)
Age: 49
End: 2024-10-16

## 2024-10-16 ENCOUNTER — RESULT REVIEW (OUTPATIENT)
Age: 49
End: 2024-10-16

## 2024-10-16 ENCOUNTER — INPATIENT (INPATIENT)
Facility: HOSPITAL | Age: 49
LOS: 0 days | Discharge: HOME | DRG: 743 | End: 2024-10-17
Attending: OBSTETRICS & GYNECOLOGY | Admitting: OBSTETRICS & GYNECOLOGY
Payer: COMMERCIAL

## 2024-10-16 ENCOUNTER — APPOINTMENT (OUTPATIENT)
Dept: OBGYN | Facility: HOSPITAL | Age: 49
End: 2024-10-16

## 2024-10-16 DIAGNOSIS — Z98.891 HISTORY OF UTERINE SCAR FROM PREVIOUS SURGERY: Chronic | ICD-10-CM

## 2024-10-16 DIAGNOSIS — Z98.890 OTHER SPECIFIED POSTPROCEDURAL STATES: Chronic | ICD-10-CM

## 2024-10-16 DIAGNOSIS — E89.0 POSTPROCEDURAL HYPOTHYROIDISM: Chronic | ICD-10-CM

## 2024-10-16 LAB
BLD GP AB SCN SERPL QL: NEGATIVE — SIGNIFICANT CHANGE UP
GLUCOSE BLDC GLUCOMTR-MCNC: 99 MG/DL — SIGNIFICANT CHANGE UP (ref 70–99)
RH IG SCN BLD-IMP: POSITIVE — SIGNIFICANT CHANGE UP

## 2024-10-16 PROCEDURE — 58571 TLH W/T/O 250 G OR LESS: CPT

## 2024-10-16 PROCEDURE — 88302 TISSUE EXAM BY PATHOLOGIST: CPT | Mod: 26

## 2024-10-16 PROCEDURE — 88307 TISSUE EXAM BY PATHOLOGIST: CPT | Mod: 26

## 2024-10-16 DEVICE — AGENT HEMOSTATIC HEMOBLAST 1.65G 10CM
Type: IMPLANTABLE DEVICE | Site: BILATERAL | Status: NON-FUNCTIONAL
Removed: 2024-10-16

## 2024-10-16 RX ORDER — CELECOXIB 200 MG/1
400 CAPSULE ORAL ONCE
Refills: 0 | Status: COMPLETED | OUTPATIENT
Start: 2024-10-16 | End: 2024-10-16

## 2024-10-16 RX ORDER — PANTOPRAZOLE SODIUM 40 MG/1
20 TABLET, DELAYED RELEASE ORAL DAILY
Refills: 0 | Status: DISCONTINUED | OUTPATIENT
Start: 2024-10-16 | End: 2024-10-17

## 2024-10-16 RX ORDER — ERGOCALCIFEROL 1.25 MG/1
0 CAPSULE ORAL
Refills: 0 | DISCHARGE

## 2024-10-16 RX ORDER — KETOROLAC TROMETHAMINE 10 MG/1
30 TABLET, FILM COATED ORAL EVERY 6 HOURS
Refills: 0 | Status: DISCONTINUED | OUTPATIENT
Start: 2024-10-16 | End: 2024-10-17

## 2024-10-16 RX ORDER — METOCLOPRAMIDE HCL 5 MG
10 TABLET ORAL EVERY 6 HOURS
Refills: 0 | Status: DISCONTINUED | OUTPATIENT
Start: 2024-10-16 | End: 2024-10-17

## 2024-10-16 RX ORDER — ONDANSETRON HCL/PF 4 MG/2 ML
8 VIAL (ML) INJECTION EVERY 6 HOURS
Refills: 0 | Status: DISCONTINUED | OUTPATIENT
Start: 2024-10-16 | End: 2024-10-17

## 2024-10-16 RX ORDER — ACETAMINOPHEN 325 MG
1000 TABLET ORAL ONCE
Refills: 0 | Status: COMPLETED | OUTPATIENT
Start: 2024-10-16 | End: 2024-10-16

## 2024-10-16 RX ORDER — HYDROMORPHONE HYDROCHLORIDE 1 MG/ML
0.2 INJECTION, SOLUTION INTRAMUSCULAR; INTRAVENOUS; SUBCUTANEOUS
Refills: 0 | Status: DISCONTINUED | OUTPATIENT
Start: 2024-10-16 | End: 2024-10-16

## 2024-10-16 RX ORDER — ONDANSETRON HCL/PF 4 MG/2 ML
1 VIAL (ML) INJECTION
Refills: 0 | DISCHARGE

## 2024-10-16 RX ORDER — ACETAMINOPHEN 325 MG
1000 TABLET ORAL EVERY 6 HOURS
Refills: 0 | Status: DISCONTINUED | OUTPATIENT
Start: 2024-10-16 | End: 2024-10-17

## 2024-10-16 RX ORDER — SODIUM CHLORIDE IRRIG SOLUTION 0.9 %
1000 SOLUTION, IRRIGATION IRRIGATION
Refills: 0 | Status: DISCONTINUED | OUTPATIENT
Start: 2024-10-16 | End: 2024-10-17

## 2024-10-16 RX ORDER — ANTACID TABLETS 500 MG/1
1 TABLET, CHEWABLE ORAL
Refills: 0 | DISCHARGE

## 2024-10-16 RX ORDER — OXYCODONE HYDROCHLORIDE 30 MG/1
5 TABLET, FILM COATED, EXTENDED RELEASE ORAL EVERY 4 HOURS
Refills: 0 | Status: DISCONTINUED | OUTPATIENT
Start: 2024-10-16 | End: 2024-10-17

## 2024-10-16 RX ORDER — OXYCODONE HYDROCHLORIDE 30 MG/1
1 TABLET, FILM COATED, EXTENDED RELEASE ORAL
Qty: 8 | Refills: 0
Start: 2024-10-16 | End: 2024-10-17

## 2024-10-16 RX ORDER — OXYCODONE HYDROCHLORIDE 30 MG/1
10 TABLET, FILM COATED, EXTENDED RELEASE ORAL EVERY 4 HOURS
Refills: 0 | Status: DISCONTINUED | OUTPATIENT
Start: 2024-10-16 | End: 2024-10-17

## 2024-10-16 RX ADMIN — HYDROMORPHONE HYDROCHLORIDE 0.2 MILLIGRAM(S): 1 INJECTION, SOLUTION INTRAMUSCULAR; INTRAVENOUS; SUBCUTANEOUS at 17:41

## 2024-10-16 RX ADMIN — Medication 10 MILLIGRAM(S): at 20:35

## 2024-10-16 RX ADMIN — CELECOXIB 400 MILLIGRAM(S): 200 CAPSULE ORAL at 11:42

## 2024-10-16 RX ADMIN — Medication 1000 MILLIGRAM(S): at 18:09

## 2024-10-16 RX ADMIN — Medication 8 MILLIGRAM(S): at 19:05

## 2024-10-16 RX ADMIN — HYDROMORPHONE HYDROCHLORIDE 0.2 MILLIGRAM(S): 1 INJECTION, SOLUTION INTRAMUSCULAR; INTRAVENOUS; SUBCUTANEOUS at 17:25

## 2024-10-16 RX ADMIN — Medication 1000 MILLIGRAM(S): at 11:41

## 2024-10-16 RX ADMIN — HYDROMORPHONE HYDROCHLORIDE 0.2 MILLIGRAM(S): 1 INJECTION, SOLUTION INTRAMUSCULAR; INTRAVENOUS; SUBCUTANEOUS at 18:02

## 2024-10-16 RX ADMIN — Medication 5000 UNIT(S): at 11:43

## 2024-10-16 RX ADMIN — KETOROLAC TROMETHAMINE 30 MILLIGRAM(S): 10 TABLET, FILM COATED ORAL at 18:09

## 2024-10-16 RX ADMIN — Medication 80 MILLIGRAM(S): at 18:15

## 2024-10-16 RX ADMIN — HYDROMORPHONE HYDROCHLORIDE 0.2 MILLIGRAM(S): 1 INJECTION, SOLUTION INTRAMUSCULAR; INTRAVENOUS; SUBCUTANEOUS at 19:07

## 2024-10-16 RX ADMIN — OXYCODONE HYDROCHLORIDE 5 MILLIGRAM(S): 30 TABLET, FILM COATED, EXTENDED RELEASE ORAL at 20:05

## 2024-10-16 RX ADMIN — HYDROMORPHONE HYDROCHLORIDE 0.2 MILLIGRAM(S): 1 INJECTION, SOLUTION INTRAMUSCULAR; INTRAVENOUS; SUBCUTANEOUS at 16:43

## 2024-10-16 RX ADMIN — PANTOPRAZOLE SODIUM 20 MILLIGRAM(S): 40 TABLET, DELAYED RELEASE ORAL at 20:00

## 2024-10-16 RX ADMIN — HYDROMORPHONE HYDROCHLORIDE 0.2 MILLIGRAM(S): 1 INJECTION, SOLUTION INTRAMUSCULAR; INTRAVENOUS; SUBCUTANEOUS at 19:08

## 2024-10-16 NOTE — H&P ADULT - NSHPPHYSICALEXAM_GEN_ALL_CORE
T(C): 35.9 (10-16-24 @ 12:38), Max: 35.9 (10-16-24 @ 12:38)  HR: 73 (10-16-24 @ 19:58) (66 - 106)  BP: 103/55 (10-16-24 @ 19:58) (103/55 - 121/89)  RR: 14 (10-16-24 @ 19:58) (12 - 28)  SpO2: 93% (10-16-24 @ 19:58) (89% - 100%)    CONSTITUTIONAL: Well groomed, no apparent distress  EYES: exophthalmos bilaterally  RESP: No respiratory distress, no use of accessory muscles  GI: Soft, appropriately tender, ND, no rebound, no guarding  SKIN: No rashes or ulcers noted; no subcutaneous nodule

## 2024-10-16 NOTE — PRE-ANESTHESIA EVALUATION ADULT - NSANTHPEFT_GEN_ALL_CORE
well developed well nourished Female In NAD  +Exopthalmos  awake, alert, and oriented  +S1/S2  B/L air entry

## 2024-10-16 NOTE — ASU DISCHARGE PLAN (ADULT/PEDIATRIC) - FINANCIAL ASSISTANCE
[facility] provides services at a reduced cost to those who are determined to be eligible through [facility]’s financial assistance program. Information regarding [facility]’s financial assistance program can be found by going to [email or web address] or by calling [phone].

## 2024-10-16 NOTE — BRIEF OPERATIVE NOTE - NSICDXBRIEFPROCEDURE_GEN_ALL_CORE_FT
PROCEDURES:  Laparoscopic total hysterectomy with bilateral salpingectomy and cystoscopy 16-Oct-2024 16:49:59  Luis A Velásquez

## 2024-10-16 NOTE — BRIEF OPERATIVE NOTE - OPERATION/FINDINGS
EUA revealed 14cm mobile AV uterus. Open Collins entry in umbilicus, 12mm port placed. 3 additional 5mm ports placed. Normal ovaries and fallopian tubes. Uterus 14cm with multiple fibroids visualized. TLH, BS completed. Cysto completed, bilateral ureteral jets visualized with good efflux of urine. Fascia closed with vicryl. Skin closed with monocryl. Excellent hemostasis. EBL 350cc.

## 2024-10-16 NOTE — PRE-ANESTHESIA EVALUATION ADULT - NSPROPOSEDPROCEDFT_GEN_ALL_CORE
Total Laparascopic Hysterectomy B/L salpingectomy. Possible Right Ovarian cystectomy Total Laparascopic Hysterectomy B/L salpingectomy. Possible Right Ovarian cystectomy.  Possible right salpingoophrectomy. Possible Cystoscopy.  POssible Laparatomy

## 2024-10-16 NOTE — H&P ADULT - ASSESSMENT
48 yo s/p TLH, BS, cystoscopy for fibroid uterus. Admit to GYN for postoperative monitoring, pain control.    Neuro: tylenol/toradol ATC, oxy PRN  CV: VSS  Pulm: RA  GI/FEN: LFD, , simethicone ATC, protonix QD, zofran/reglan PRN. -/-  Endo: Grave disease - levothyroxine QD  GYN: s/p TLH, BS, cysto 10/16  : s/p cook  Heme: H 13.6  DVT: SCDs    A+ 10/16

## 2024-10-16 NOTE — ASU DISCHARGE PLAN (ADULT/PEDIATRIC) - ACTIVITY LEVEL
2wks or until cleared by your doctor/No exercise/No heavy lifting/Weight bearing as tolerated/Nothing per rectum/Nothing per vagina/No tub baths/No douching/No tampons/No intercourse

## 2024-10-16 NOTE — H&P ADULT - HISTORY OF PRESENT ILLNESS
50 yo  presented for scheduled surgical management of symptomatic fibroids.    OB: C/S x2  GYN: Fibroids; Hx mirena  PMHX graves, eye issues  PSHX c/s , thyroidectomy, eye sx  MEDS synthroid, prednisone for the eyes  NKDA  FHX neg

## 2024-10-16 NOTE — ASU DISCHARGE PLAN (ADULT/PEDIATRIC) - CARE PROVIDER_API CALL
Frank Bañuelos  Obstetrics and Gynecology  [address] [place] [address], [address]  [place] [address]  Phone: [phone]  Fax: [phone]  Follow Up Time:

## 2024-10-17 ENCOUNTER — TRANSCRIPTION ENCOUNTER (OUTPATIENT)
Age: 49
End: 2024-10-17

## 2024-10-17 VITALS
TEMPERATURE: 98 F | HEART RATE: 65 BPM | RESPIRATION RATE: 16 BRPM | SYSTOLIC BLOOD PRESSURE: 95 MMHG | DIASTOLIC BLOOD PRESSURE: 60 MMHG | OXYGEN SATURATION: 98 %

## 2024-10-17 LAB
BASOPHILS # BLD AUTO: 0.1 K/UL — SIGNIFICANT CHANGE UP (ref 0–0.2)
BASOPHILS NFR BLD AUTO: 1.3 % — SIGNIFICANT CHANGE UP (ref 0–2)
EOSINOPHIL # BLD AUTO: 0.1 K/UL — SIGNIFICANT CHANGE UP (ref 0–0.5)
EOSINOPHIL NFR BLD AUTO: 1.3 % — SIGNIFICANT CHANGE UP (ref 0–6)
HCT VFR BLD CALC: 29.5 % — LOW (ref 34.5–45)
HGB BLD-MCNC: 9.4 G/DL — LOW (ref 11.5–15.5)
IMM GRANULOCYTES NFR BLD AUTO: 0.3 % — SIGNIFICANT CHANGE UP (ref 0–0.9)
LYMPHOCYTES # BLD AUTO: 2.76 K/UL — SIGNIFICANT CHANGE UP (ref 1–3.3)
LYMPHOCYTES # BLD AUTO: 34.8 % — SIGNIFICANT CHANGE UP (ref 13–44)
MCHC RBC-ENTMCNC: 27.9 PG — SIGNIFICANT CHANGE UP (ref 27–34)
MCHC RBC-ENTMCNC: 31.9 GM/DL — LOW (ref 32–36)
MCV RBC AUTO: 87.5 FL — SIGNIFICANT CHANGE UP (ref 80–100)
MONOCYTES # BLD AUTO: 0.64 K/UL — SIGNIFICANT CHANGE UP (ref 0–0.9)
MONOCYTES NFR BLD AUTO: 8.1 % — SIGNIFICANT CHANGE UP (ref 2–14)
NEUTROPHILS # BLD AUTO: 4.3 K/UL — SIGNIFICANT CHANGE UP (ref 1.8–7.4)
NEUTROPHILS NFR BLD AUTO: 54.2 % — SIGNIFICANT CHANGE UP (ref 43–77)
NRBC # BLD: 0 /100 WBCS — SIGNIFICANT CHANGE UP (ref 0–0)
PLATELET # BLD AUTO: 389 K/UL — SIGNIFICANT CHANGE UP (ref 150–400)
RBC # BLD: 3.37 M/UL — LOW (ref 3.8–5.2)
RBC # FLD: 13.3 % — SIGNIFICANT CHANGE UP (ref 10.3–14.5)
WBC # BLD: 7.92 K/UL — SIGNIFICANT CHANGE UP (ref 3.8–10.5)
WBC # FLD AUTO: 7.92 K/UL — SIGNIFICANT CHANGE UP (ref 3.8–10.5)

## 2024-10-17 PROCEDURE — 86900 BLOOD TYPING SEROLOGIC ABO: CPT

## 2024-10-17 PROCEDURE — C9399: CPT

## 2024-10-17 PROCEDURE — 36415 COLL VENOUS BLD VENIPUNCTURE: CPT

## 2024-10-17 PROCEDURE — 86850 RBC ANTIBODY SCREEN: CPT

## 2024-10-17 PROCEDURE — 86901 BLOOD TYPING SEROLOGIC RH(D): CPT

## 2024-10-17 PROCEDURE — 88307 TISSUE EXAM BY PATHOLOGIST: CPT

## 2024-10-17 PROCEDURE — 82962 GLUCOSE BLOOD TEST: CPT

## 2024-10-17 PROCEDURE — 88302 TISSUE EXAM BY PATHOLOGIST: CPT

## 2024-10-17 PROCEDURE — 85025 COMPLETE CBC W/AUTO DIFF WBC: CPT

## 2024-10-17 RX ORDER — ACETAMINOPHEN 325 MG
2 TABLET ORAL
Qty: 0 | Refills: 0 | DISCHARGE
Start: 2024-10-17

## 2024-10-17 RX ORDER — POLYVINYL ALCOHOL 1 %
1 DROPS OPHTHALMIC (EYE) EVERY 8 HOURS
Refills: 0 | Status: DISCONTINUED | OUTPATIENT
Start: 2024-10-17 | End: 2024-10-17

## 2024-10-17 RX ADMIN — Medication 1000 MILLIGRAM(S): at 05:19

## 2024-10-17 RX ADMIN — Medication 80 MILLIGRAM(S): at 00:23

## 2024-10-17 RX ADMIN — KETOROLAC TROMETHAMINE 30 MILLIGRAM(S): 10 TABLET, FILM COATED ORAL at 00:23

## 2024-10-17 RX ADMIN — Medication 137 MICROGRAM(S): at 05:20

## 2024-10-17 RX ADMIN — KETOROLAC TROMETHAMINE 30 MILLIGRAM(S): 10 TABLET, FILM COATED ORAL at 05:19

## 2024-10-17 RX ADMIN — Medication 1 DROP(S): at 06:53

## 2024-10-17 RX ADMIN — Medication 80 MILLIGRAM(S): at 05:19

## 2024-10-17 RX ADMIN — Medication 1000 MILLIGRAM(S): at 00:23

## 2024-10-17 NOTE — DISCHARGE NOTE PROVIDER - NSDCFUSCHEDAPPT_GEN_ALL_CORE_FT
Frank Bañuelos  [facility]  [facility], [place] [address]  Scheduled Appointment: 10/29/2024

## 2024-10-17 NOTE — DISCHARGE NOTE NURSING/CASE MANAGEMENT/SOCIAL WORK - FINANCIAL ASSISTANCE
[facility] provides services at a reduced cost to those who are determined to be eligible through [facility]’s financial assistance program. Information regarding [facility]’s financial assistance program can be found by going to [email or web address] or by calling [phone]. yes

## 2024-10-17 NOTE — DISCHARGE NOTE PROVIDER - CARE PROVIDER_API CALL
Frank Bañuelos  Obstetrics and Gynecology  [address] [place] [address], [address]  [place] [address]  Phone: [phone]  Fax: [phone]  Established Patient  Follow Up Time: 2 weeks

## 2024-10-17 NOTE — DISCHARGE NOTE PROVIDER - NSDCMRMEDTOKEN_GEN_ALL_CORE_FT
acetaminophen 500 mg oral tablet: 2 tab(s) orally every 6 hours  ergocalciferol:   levothyroxine 137 mcg (0.137 mg) oral tablet: 1 tab(s) orally  PriLOSEC 20 mg oral delayed release capsule: 1 cap(s) orally  Tums 500 oral tablet, chewable (obsolete): 1 tab(s) chewed  Zofran 8 mg oral tablet: 1 tab(s) orally   [phone]

## 2024-10-17 NOTE — DISCHARGE NOTE PROVIDER - ATTENDING ATTESTATION STATEMENT
Alternative pantoprazole sent   I have personally seen and examined the patient. I have collaborated with and supervised the

## 2024-10-17 NOTE — DISCHARGE NOTE NURSING/CASE MANAGEMENT/SOCIAL WORK - PATIENT PORTAL LINK FT
You can access the FollowMyHealth Patient Portal offered by [facility] by registering at the following website: [email or web address]. By joining [employer]’s FollowMyHealth portal, you will also be able to view your health information using other applications (apps) compatible with our system.

## 2024-10-17 NOTE — DISCHARGE NOTE PROVIDER - DISCHARGE DIET
Pinkeye From Bacteria in Children: Care Instructions  Your Care Instructions    Leticia Hickman is a problem that many children get. In pinkeye, the lining of the eyelid and the eye surface become red and swollen. The lining is called the conjunctiva (say \"lupb-[id number]-JQ-[id number]\"). Pinkeye is also called conjunctivitis (say \"swu-SYLF-ihf-VY-tus\"). Pinkeye can be caused by bacteria, a virus, or an allergy. Your child's pinkeye is caused by bacteria. This type of pinkeye can spread quickly from person to person, usually from touching. Pinkeye from bacteria usually clears up 2 to 3 days after your child starts treatment with antibiotic eyedrops or ointment. Follow-up care is a key part of your child's treatment and safety. Be sure to make and go to all appointments, and call your doctor if your child is having problems. It's also a good idea to know your child's test results and keep a list of the medicines your child takes. How can you care for your child at home? Use antibiotics as directed  If the doctor gave your child antibiotic medicine, such as an ointment or eyedrops, use it as directed. Do not stop using it just because your child's eyes start to look better. Your child needs to take the full course of antibiotics. Keep the bottle tip clean. To put in eyedrops or ointment:  · Tilt your child's head back and pull his or her lower eyelid down with one finger. · Drop or squirt the medicine inside the lower lid. · Have your child close the eye for 30 to 60 seconds to let the drops or ointment move around. · Do not touch the tip of the bottle or tube to your child's eye, eyelid, eyelashes, or any other surface. Make your child comfortable  · Use moist cotton or a clean, wet cloth to remove the crust from your child's eyes. Wipe from the inside corner of the eye to the outside. Use a clean part of the cloth for each wipe.   · Put cold or warm wet cloths on your child's eyes a few times a day if the eyes hurt or are itching. · Do not have your child wear contact lenses until the pinkeye is gone. Clean the contacts and storage case. · If your child wears disposable contacts, get out a new pair when the eyes have cleared and it is safe to wear contacts again. Prevent pinkeye from spreading  · Wash your hands and your child's hands often. Always wash them before and after you treat pinkeye or touch your child's eyes or face. · Do not have your child share towels, pillows, or washcloths while he or she has pinkeye. Use clean linens, towels, and washcloths each day. · Do not share contact lens equipment, containers, or solutions. · Do not share eye medicine. When should you call for help? Call your doctor now or seek immediate medical care if:    · Your child has pain in an eye, not just irritation on the surface.     · Your child has a change in vision or a loss of vision.     · Your child's eye gets worse or is not better within 48 hours after he or she started antibiotics.    Watch closely for changes in your child's health, and be sure to contact your doctor if your child has any problems. Where can you learn more? Go to http://aubrie-meera.info/. Enter U594 in the search box to learn more about \"Pinkeye From Bacteria in Children: Care Instructions. \"  Current as of: November 20, 2017  Content Version: 11.7  © 2005-3563 [employer]. Care instructions adapted under license by [employer] (which disclaims liability or warranty for this information). If you have questions about a medical condition or this instruction, always ask your healthcare professional. [place] [id number] any warranty or liability for your use of this information. Regular Diet - No restrictions

## 2024-10-17 NOTE — PROGRESS NOTE ADULT - SUBJECTIVE AND OBJECTIVE BOX
Pt seen and examined at bedside. Pt states mild abdominal pain. Pt is ambulating. Did not attempt to eat last night. Not yet passing flatus. Urinating adequately.   Pt denies fever, chills, chest pain, SOB, nausea, vomiting, lightheadedness, dizziness.      T(F): 98.3 (10-17-24 @ 05:11), Max: 99.4 (10-16-24 @ 22:21)  HR: 63 (10-17-24 @ 05:11) (63 - 106)  BP: 109/72 (10-17-24 @ 05:11) (100/55 - 121/89)  RR: 17 (10-17-24 @ 05:11) (12 - 28)  SpO2: 95% (10-17-24 @ 05:11) (89% - 100%)  I&O's Summary    16 Oct 2024 07:01  -  17 Oct 2024 06:55  --------------------------------------------------------  IN: 1495 mL / OUT: 850 mL / NET: 645 mL        MEDICATIONS  (STANDING):  acetaminophen     Tablet .. 1000 milliGRAM(s) Oral every 6 hours  artificial tears (preservative free) Ophthalmic Solution 1 Drop(s) Both EYES every 8 hours  ketorolac   Injectable 30 milliGRAM(s) IV Push every 6 hours  lactated ringers. 1000 milliLiter(s) (125 mL/Hr) IV Continuous <Continuous>  levothyroxine 137 MICROGram(s) Oral every 24 hours  pantoprazole  Injectable 20 milliGRAM(s) IV Push daily  simethicone 80 milliGRAM(s) Chew every 6 hours    MEDICATIONS  (PRN):  metoclopramide Injectable 10 milliGRAM(s) IV Push every 6 hours PRN Nausea and/or Vomiting  ondansetron Injectable 8 milliGRAM(s) IV Push every 6 hours PRN Nausea and/or Vomiting  oxyCODONE    IR 10 milliGRAM(s) Oral every 4 hours PRN Severe Pain (7 - 10)  oxyCODONE    IR 5 milliGRAM(s) Oral every 4 hours PRN Moderate Pain (4 - 6)      Physical Exam:  Constitutional: NAD  Pulmonary: no increased work of breathing  Cardiovascular: Regular rate and rhythm   Abdomen: incision sites clean, dry, intact. Soft, mildly tender and distended, no guarding, no rebound, +bowel sounds  Extremities: no lower extremity edema or calf tenderness. SCDs in place

## 2024-10-17 NOTE — DISCHARGE NOTE PROVIDER - HOSPITAL COURSE
50 yo w/ PMH Grave disease, exophthalmos presented for scheduled surgical management of symptomatic fibroids, s/p laparoscopic total hysterectomy, bilateral salpingectomy, cystoscopy. She met postoperative milestones appropriately and at discharge is hemodynamically stable.

## 2024-10-24 LAB — SURGICAL PATHOLOGY STUDY: SIGNIFICANT CHANGE UP

## 2024-10-25 DIAGNOSIS — E05.00 THYROTOXICOSIS WITH DIFFUSE GOITER WITHOUT THYROTOXIC CRISIS OR STORM: ICD-10-CM

## 2024-10-25 DIAGNOSIS — N92.0 EXCESSIVE AND FREQUENT MENSTRUATION WITH REGULAR CYCLE: ICD-10-CM

## 2024-10-25 DIAGNOSIS — D25.9 LEIOMYOMA OF UTERUS, UNSPECIFIED: ICD-10-CM

## 2024-10-29 ENCOUNTER — APPOINTMENT (OUTPATIENT)
Dept: OBGYN | Facility: CLINIC | Age: 49
End: 2024-10-29
Payer: COMMERCIAL

## 2024-10-29 VITALS
HEIGHT: 63 IN | HEART RATE: 74 BPM | OXYGEN SATURATION: 97 % | DIASTOLIC BLOOD PRESSURE: 69 MMHG | SYSTOLIC BLOOD PRESSURE: 101 MMHG

## 2024-10-29 PROCEDURE — 99024 POSTOP FOLLOW-UP VISIT: CPT

## 2024-12-03 ENCOUNTER — APPOINTMENT (OUTPATIENT)
Dept: OBGYN | Facility: CLINIC | Age: 49
End: 2024-12-03
Payer: COMMERCIAL

## 2024-12-03 VITALS — HEART RATE: 67 BPM | SYSTOLIC BLOOD PRESSURE: 115 MMHG | DIASTOLIC BLOOD PRESSURE: 60 MMHG | OXYGEN SATURATION: 99 %

## 2024-12-03 PROCEDURE — 99024 POSTOP FOLLOW-UP VISIT: CPT

## (undated) DEVICE — DRSG DERMABOND 0.7ML

## (undated) DEVICE — UTERINE MANIPULATOR CONMED VCARE LG 37MM

## (undated) DEVICE — INSUFFLATION NDL COVIDIEN SURGINEEDLE VERESS 120MM

## (undated) DEVICE — FOLEY TRAY 16FR 5CC LF UMETER CLOSED

## (undated) DEVICE — D HELP - CLEARVIEW CLEARIFY SYSTEM

## (undated) DEVICE — SUT SILK 0 18" TIES

## (undated) DEVICE — SUT VICRYL 0 27" UR-6

## (undated) DEVICE — PACK GYN WDC

## (undated) DEVICE — UTERINE MANIPULATOR CONMED VCARE MED 34MM

## (undated) DEVICE — PACK ABDOMINAL GENERAL CLOSING

## (undated) DEVICE — WARMING BLANKET UPPER ADULT

## (undated) DEVICE — VENODYNE/SCD SLEEVE CALF MEDIUM

## (undated) DEVICE — LIGASURE BLUNT TIP 37CM

## (undated) DEVICE — TROCAR COVIDIEN VERSAPORT BLADELESS OPTICAL 12MM STANDARD

## (undated) DEVICE — SUT MONOCRYL 4-0 27" PS-2 UNDYED

## (undated) DEVICE — RUMI COLPO-PNEUMO OCCLUDER

## (undated) DEVICE — UTERINE MANIPULATOR CONMED VCARE SM 32MM

## (undated) DEVICE — TROCAR COVIDIEN VERSAPORT BLADELESS OPTICAL 5MM STANDARD

## (undated) DEVICE — SOL IRR BAG NS 0.9% 3000ML

## (undated) DEVICE — POSITIONER PINK PAD PIGAZZI SYSTEM XL W ARM PROTECTOR

## (undated) DEVICE — POSITIONER FOAM EGG CRATE ULNAR 2PCS (PINK)

## (undated) DEVICE — GLV 7 PROTEXIS (WHITE)

## (undated) DEVICE — TUBING STRYKEFLOW II SUCTION / IRRIGATOR